# Patient Record
Sex: FEMALE
[De-identification: names, ages, dates, MRNs, and addresses within clinical notes are randomized per-mention and may not be internally consistent; named-entity substitution may affect disease eponyms.]

---

## 2017-03-23 ENCOUNTER — HOSPITAL ENCOUNTER (EMERGENCY)
Dept: HOSPITAL 5 - ED | Age: 52
Discharge: HOME | End: 2017-03-23
Payer: SELF-PAY

## 2017-03-23 VITALS — SYSTOLIC BLOOD PRESSURE: 109 MMHG | DIASTOLIC BLOOD PRESSURE: 73 MMHG

## 2017-03-23 DIAGNOSIS — F12.10: ICD-10-CM

## 2017-03-23 DIAGNOSIS — R07.9: ICD-10-CM

## 2017-03-23 DIAGNOSIS — J40: Primary | ICD-10-CM

## 2017-03-23 DIAGNOSIS — F17.200: ICD-10-CM

## 2017-03-23 DIAGNOSIS — I10: ICD-10-CM

## 2017-03-23 LAB
ANION GAP SERPL CALC-SCNC: 19 MMOL/L
BASOPHILS NFR BLD AUTO: 0.8 % (ref 0–1.8)
BUN SERPL-MCNC: 9 MG/DL (ref 7–17)
BUN/CREAT SERPL: 18 %
CALCIUM SERPL-MCNC: 9.7 MG/DL (ref 8.4–10.2)
CHLORIDE SERPL-SCNC: 95.7 MMOL/L (ref 98–107)
CO2 SERPL-SCNC: 22 MMOL/L (ref 22–30)
EOSINOPHIL NFR BLD AUTO: 0.8 % (ref 0–4.3)
GLUCOSE SERPL-MCNC: 97 MG/DL (ref 65–100)
HCT VFR BLD CALC: 38.5 % (ref 30.3–42.9)
HGB BLD-MCNC: 12.8 GM/DL (ref 10.1–14.3)
MCH RBC QN AUTO: 33 PG (ref 28–32)
MCHC RBC AUTO-ENTMCNC: 33 % (ref 30–34)
MCV RBC AUTO: 99 FL (ref 79–97)
PLATELET # BLD: 148 K/MM3 (ref 140–440)
POTASSIUM SERPL-SCNC: 3.8 MMOL/L (ref 3.6–5)
RBC # BLD AUTO: 3.89 M/MM3 (ref 3.65–5.03)
SODIUM SERPL-SCNC: 133 MMOL/L (ref 137–145)
WBC # BLD AUTO: 7.6 K/MM3 (ref 4.5–11)

## 2017-03-23 PROCEDURE — 84484 ASSAY OF TROPONIN QUANT: CPT

## 2017-03-23 PROCEDURE — 83880 ASSAY OF NATRIURETIC PEPTIDE: CPT

## 2017-03-23 PROCEDURE — 71275 CT ANGIOGRAPHY CHEST: CPT

## 2017-03-23 PROCEDURE — 71010: CPT

## 2017-03-23 PROCEDURE — 93005 ELECTROCARDIOGRAM TRACING: CPT

## 2017-03-23 PROCEDURE — 85025 COMPLETE CBC W/AUTO DIFF WBC: CPT

## 2017-03-23 PROCEDURE — 80048 BASIC METABOLIC PNL TOTAL CA: CPT

## 2017-03-23 PROCEDURE — 36415 COLL VENOUS BLD VENIPUNCTURE: CPT

## 2017-03-23 PROCEDURE — 96374 THER/PROPH/DIAG INJ IV PUSH: CPT

## 2017-03-23 PROCEDURE — 85379 FIBRIN DEGRADATION QUANT: CPT

## 2017-03-23 PROCEDURE — 99285 EMERGENCY DEPT VISIT HI MDM: CPT

## 2017-03-23 NOTE — EMERGENCY DEPARTMENT REPORT
ED Chest Pain HPI





- General


Chief Complaint: Chest Pain


Stated Complaint: CHEST PAIN


Time Seen by Provider: 03/23/17 18:10


Source: patient


Mode of arrival: Ambulatory


Limitations: No Limitations





- History of Present Illness


Initial Comments: 


This is a 51-year-old  female presents to the emergency department by 

EMS from home with complaint of midsternal and left-sided chest discomfort has 

been going on since yesterday.  It is a constant pain but the intensity appears 

to wax and wane.  It does not radiate.  It is associated with a more chronic 

cough that is mixed dry and productive and has been going on for the past 

month.  The patient says that she has a history of this recurrent cough in the 

past and the patient says that she got some "small bottle medications that 

really worked to stop by cough but it was a small amount of medication."  She 

denies any recent travel or sick contacts at home.  She is a tobacco smoker but 

denies any illicit drug use.  She took some aspirin for her symptoms today 

without much relief.  She denies any other significant past medical history.  

She does not have a primary care doctor.  Patient last had a negative stress 

test here at [facility] in May 2016.  The patient also complains of some 

sinus congestion and drainage and says that she has gone through "3 packs of 

toilet paper" in dealing with this.








- Related Data


 Previous Rx's











 Medication  Instructions  Recorded  Last Taken  Type


 


Famotidine [Pepcid] 20 mg PO BID #60 tablet 05/13/16 Unknown Rx


 


Fluticasone [Flonase] 1 spray NS QDAY #1 bottle 03/23/17 Unknown Rx


 


guaiFENesin/CODEINE [Robitussin AC] 5 ml PO Q6H PRN #100 ml 03/23/17 Unknown Rx











 Allergies











Allergy/AdvReac Type Severity Reaction Status Date / Time


 


Penicillins AdvReac  Unknown Verified 04/19/15 14:27














JUDE score





- Jude Score


Age > 65: (0) No


Aspirin use within the Past 7 Days: (0) No


3 or more CAD Risk Factors: (0) No


2 or more Angina events in past 24 hrs: (1) Yes


Known CAD with more than 50% Stenosis: (0) No


Elevated Cardiac Markers: (0) No


ST Deviation Greater than 0.5mm: (0) No


JUDE Score: 1





ED Review of Systems


ROS: 


Stated complaint: CHEST PAIN


Other details as noted in HPI





Comment: All other systems reviewed and negative


Constitutional: denies: chills, fever


Eyes: denies: eye pain, eye discharge, vision change


ENT: congestion.  denies: ear pain, throat pain


Respiratory: cough.  denies: orthopnea


Cardiovascular: chest pain.  denies: edema


Gastrointestinal: denies: abdominal pain, nausea, diarrhea


Genitourinary: denies: urgency, dysuria, discharge


Musculoskeletal: denies: back pain, joint swelling, arthralgia


Skin: denies: rash, lesions


Neurological: denies: headache, weakness, paresthesias





ED Past Medical Hx





- Past Medical History


Hx Hypertension: Yes


Hx Congestive Heart Failure: No


Hx Diabetes: No


Hx Asthma: No


Hx COPD: No


Additional medical history: DIVERCULITITS





- Surgical History


Past Surgical History?: Yes


Additional Surgical History: HERNIA REPAIR X 5, colostomy bag from 

diverticulitis





- Social History


Smoking Status: Current Every Day Smoker


Substance Use Type: Alcohol, Marijuana





- Medications


Home Medications: 


 Home Medications











 Medication  Instructions  Recorded  Confirmed  Last Taken  Type


 


Famotidine [Pepcid] 20 mg PO BID #60 tablet 05/13/16  Unknown Rx


 


Fluticasone [Flonase] 1 spray NS QDAY #1 bottle 03/23/17  Unknown Rx


 


guaiFENesin/CODEINE [Robitussin AC] 5 ml PO Q6H PRN #100 ml 03/23/17  Unknown Rx














ED Physical Exam





- General


Limitations: No Limitations





- Other


Other exam information: 


GENERAL: The patient is well-developed well-nourished.


HEENT: Normocephalic.  Atraumatic.  Extraocular motions are intact.  Patient 

has moist mucous membranes.  Pupils equal reactive to light bilaterally.


NECK: Supple.  Trachea is midline.


CHEST/LUNGS: Clear to auscultation.  There is no respiratory distress noted.  

Chest pain is reproducible to palpation of chest wall.


HEART/CARDIOVASCULAR: Regular.  There is no tachycardia.  There is no gallop 

rub or murmur.


ABDOMEN: Abdomen is soft, nontender.  Patient has normal bowel sounds.  There 

is no abdominal distention.


SKIN: There is no rash.  There is no edema.  There is no diaphoresis.


NEURO: The patient is awake, alert, and oriented.  The patient is cooperative.  

The patient has no focal neurologic deficits.  The patient has normal speech.


MUSCULOSKELETAL: There is no tenderness or deformity.  There is no limitation 

range of motion.  There is no evidence of acute injury.








ED Course


 Vital Signs











  03/23/17 03/23/17 03/23/17





  09:19 09:30 10:00


 


Temperature   


 


Pulse Rate 109 H 125 H 114 H


 


Respiratory 21 32 H 9 L





Rate   


 


Blood Pressure  122/70 119/69


 


O2 Sat by Pulse   





Oximetry   














  03/23/17 03/23/17 03/23/17





  10:30 11:00 11:30


 


Temperature   


 


Pulse Rate 102 H 95 H 121 H


 


Respiratory 14 20 24





Rate   


 


Blood Pressure 110/67 111/68 131/81


 


O2 Sat by Pulse 100 100 100





Oximetry   














  03/23/17 03/23/17 03/23/17





  12:01 12:30 13:00


 


Temperature   


 


Pulse Rate  129 H 114 H


 


Respiratory  13 21





Rate   


 


Blood Pressure 161/112 166/82 149/74


 


O2 Sat by Pulse 73 L 97 99





Oximetry   














  03/23/17 03/23/17 03/23/17





  13:30 14:00 17:45


 


Temperature   98 F


 


Pulse Rate 101 H 104 H 82


 


Respiratory 14 14 16





Rate   


 


Blood Pressure 152/83 145/84 141/98


 


O2 Sat by Pulse 98 98 100





Oximetry   














  03/23/17 03/23/17 03/23/17





  19:13 19:30 20:00


 


Temperature   


 


Pulse Rate  79 80


 


Respiratory  11 L 10 L





Rate   


 


Blood Pressure 121/77 119/71 113/81


 


O2 Sat by Pulse  99 





Oximetry   














  03/23/17 03/23/17 03/23/17





  20:34 21:00 21:30


 


Temperature   


 


Pulse Rate   


 


Respiratory   





Rate   


 


Blood Pressure 119/71 124/82 118/76


 


O2 Sat by Pulse 99 92 97





Oximetry   














  03/23/17





  22:00


 


Temperature 


 


Pulse Rate 


 


Respiratory 





Rate 


 


Blood Pressure 109/73


 


O2 Sat by Pulse 98





Oximetry 














ED Medical Decision Making





- Lab Data


Result diagrams: 


 03/23/17 18:25





 03/23/17 18:18





- EKG Data


-: EKG Interpreted by Me


EKG shows normal: sinus rhythm, axis, intervals, QRS complexes, ST-T waves


Rate: normal





- EKG Data


When compared to previous EKG there are: previous EKG unavailable


Interpretation: normal EKG





- Radiology Data


Radiology results: report reviewed, image reviewed


interpreted by me: 


Chest x-ray did not show any acute process.  Heart is normal shape and size.  

No effusions.  No pneumothorax.  No signs of pneumonia seen.





CT angiography of the chest does not show any pulmonary embolism.  There is 

ectasia of the ascending thoracic aorta.  The thoracic aortic arch and 

descending thoracic aorta well normal in caliber.  No dissection.  Lungs are 

well-expanded.  No infiltrates effusions or pneumothorax.








- Medical Decision Making


51-year-old female presents to the emergency department after complaining of a 

productive cough and some chest discomfort.  Patient was given some Robitussin-

AC and there is been no cough heard during examination or during her ED course.

  The chest pain is reproducible and appears consistent with costochondritis.  

EKG did not show any signs of ST elevation MI.  Patient negative troponins 2 

the emergency department.  Her d-dimer was slightly elevated at equivocal so a 

CT angiography was done that did not show any PE, dissection, pneumonia or any 

acute process.  Patient had a negative stress test less than one year ago.  

With all this, the patient appears safe for discharge home.  She was given a 

referral for cardiology K she wants to pursue an outpatient stress test.  

Otherwise she will return to the ER with any worsening of her symptoms or any 

acute distress.








- Differential Diagnosis


MI, PE, costochondritis, pneumonia, bronchitis


Critical Care Time: No


Critical care attestation.: 


If time is entered above; I have spent that time in minutes in the direct care 

of this critically ill patient, excluding procedure time.








ED Disposition


Clinical Impression: 


 Bronchitis





Chest pain


Qualifiers:


 Chest pain type: unspecified Qualified Code(s): R07.9 - Chest pain, unspecified





Disposition: DISCHARGED TO HOME OR SELFCARE


Is pt being admited?: No


Condition: Stable


Instructions:  Chest Pain (ED), Acute Bronchitis (ED)


Additional Instructions: 


Please follow-up with your primary care doctor in the next few days.  I 

referral for a local cardiologist, Dr. Henriquez, to follow-up regarding your 

chest discomfort for a possible outpatient stress test.  Return to the 

emergency department with any worsening of your symptoms or any acute distress.


Prescriptions: 


Fluticasone [Flonase] 1 spray NS QDAY #1 bottle


guaiFENesin/CODEINE [Robitussin AC] 5 ml PO Q6H PRN #100 ml


 PRN Reason: Cough


Referrals: 


PRIMARY CARE,MD [Primary Care Provider] - 3-5 Days


TERE HENRIQUEZ MD [Staff Physician] - 3-5 Days


Time of Disposition: 22:20

## 2017-03-24 NOTE — XRAY REPORT
PORTABLE CHEST



INDICATION: Chest pain.



COMPARISON: 05/13/2016



FINDINGS: Portable, frontal chest radiograph demonstrates new mild 

horizontal left basilar atelectasis.  Otherwise clear lungs.  Normal 

cardiomediastinal silhouette.  Mild aortic knob calcifications.  Stable 

bones.



CONCLUSION: New slight left basilar atelectasis, as described.



Thank you for the opportunity to participate in this patient's care.

## 2017-05-11 ENCOUNTER — HOSPITAL ENCOUNTER (EMERGENCY)
Dept: HOSPITAL 5 - ED | Age: 52
Discharge: HOME | End: 2017-05-11
Payer: SELF-PAY

## 2017-05-11 VITALS — DIASTOLIC BLOOD PRESSURE: 86 MMHG | SYSTOLIC BLOOD PRESSURE: 130 MMHG
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
## 2017-05-11 DIAGNOSIS — I10: ICD-10-CM

## 2017-05-11 DIAGNOSIS — M54.5: Primary | ICD-10-CM
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
## 2017-05-11 DIAGNOSIS — F17.200: ICD-10-CM
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
## 2017-05-11 PROCEDURE — 99282 EMERGENCY DEPT VISIT SF MDM: CPT

## 2017-05-11 PROCEDURE — 96372 THER/PROPH/DIAG INJ SC/IM: CPT

## 2017-05-11 NOTE — EMERGENCY DEPARTMENT REPORT
ED Fall HPI





- General


Chief Complaint: Fall


Stated Complaint: BACK/BUTTOCK PAIN


Time Seen by Provider: 05/11/17 17:15


Source: patient


Mode of arrival: Ambulatory





- History of Present Illness


Initial Comments: 





51-year-old  female comes and states that she tripped and fell last 

Friday states she hit the back of her head on a dresser.  She complains of 

headache and lower back pain and sacral pain.  Denies vision changes no nausea 

no vomiting no dizziness.  She reports she took Tylenol which helped a little 

bit.  She reports her headache is off and on area and she does report she has a 

goose said in the back of her head.  No past medical history currently takes no 

medication and she is only allergic to penicillin.


MD Complaint: fall


When Fall Occurred: # days PTA (5)


Fall Witnessed: no


Place Fall Occurred: home





- Related Data


 Previous Rx's











 Medication  Instructions  Recorded  Last Taken  Type


 


Naproxen [Naprosyn TAB] 500 mg PO BID #20 tablet 05/11/17 Unknown Rx











 Allergies











Allergy/AdvReac Type Severity Reaction Status Date / Time


 


Penicillins AdvReac  Rash Verified 05/11/17 15:09














ED Review of Systems


ROS: 


Stated complaint: BACK/BUTTOCK PAIN


Other details as noted in HPI








ED Past Medical Hx





- Past Medical History


Hx Hypertension: Yes


Hx Congestive Heart Failure: No


Hx Diabetes: No


Hx Asthma: No


Hx COPD: No


Additional medical history: DIVERCULITITS





- Surgical History


Additional Surgical History: HERNIA REPAIR X 5, colostomy bag from 

diverticulitis / REVERSAL.  C-SECTIONS X 2.  TUBAL LIGATION





- Social History


Smoking Status: Current Every Day Smoker


Substance Use Type: Alcohol





- Medications


Home Medications: 


 Home Medications











 Medication  Instructions  Recorded  Confirmed  Last Taken  Type


 


Naproxen [Naprosyn TAB] 500 mg PO BID #20 tablet 05/11/17  Unknown Rx














ED Physical Exam





- General


Limitations: No Limitations





- Eye


Eye exam: Present: normal appearance, PERRL, EOMI


Pupils: Present: normal accommodation





- ENT


ENT exam: Present: normal exam, mucous membranes moist





- Neck


Neck exam: Present: normal inspection





- Respiratory


Respiratory exam: Present: normal lung sounds bilaterally





- Cardiovascular


Cardiovascular Exam: Present: regular rate, normal rhythm, normal heart sounds





- Back Exam


Back exam: Present: normal inspection, full ROM, paraspinal tenderness (sacral)





- Expanded Neurological Exam


  ** Expanded


Speech: Present: fluid speech


Cranial nerves: EOM's Intact: Normal, Gag Reflex: Normal, Tongue Deviation: 

Normal, Nystagmus: Normal


Cerebellar function: Finger to Nose: Normal, Heel to Shin: Normal, Romberg: 

Normal


Upper motor neuron: Fredo Neglect: Normal, Pronator Drift: Normal


Sensory exam: Upper Extremity Light Touch: Normal, Upper Extremity Pin Prick: 

Normal, Upper Extremity Temperature: Normal, UE 2 Point Discrimination: Normal, 

Lower Extremity Light Touch: Normal, Lower Extremity Pin Prick: Normal


Motor strength exam: RUE: 4, LUE: 4, RLE: 4, LLE: 4


Best Eye Response (Shahid): (4) open spontaneously


Best Motor Response (Bozrah): (6) obeys commands


Best Verbal Response (Shahid): (5) oriented


Bozrah Total: 15





- Psychiatric


Psychiatric exam: Present: normal affect, normal mood





- Skin


Skin exam: Present: dry, intact, normal color





ED Course





 Vital Signs











  05/11/17





  15:12


 


Temperature 97.9 F


 


Pulse Rate 91 H


 


Respiratory 17





Rate 


 


Blood Pressure 129/92


 


O2 Sat by Pulse 100





Oximetry 














- Reevaluation(s)


Reevaluation #1: 





05/11/17 18:37


Patient reports she feels much better after having the Toradol injection.





ED Medical Decision Making





- Medical Decision Making





Patient has been evaluated by this provider fast track.  Discussed patient that 

since her fall was on Friday and she has no neurological deficits that we will 

not do a head CT.  Patient's in a gradients she is more concerned about her 

lower back and that we can get her out of pain.  Discussed the patient and give 

her Toradol injection 30 mg IM.  Discussed patient that I would discharge her 

on her Naproxen today.  Discussed with patient that she needs to follow up with 

the primary care provider if pain persists or gets worse.  Patient verbalized 

understanding.


Critical care attestation.: 


If time is entered above; I have spent that time in minutes in the direct care 

of this critically ill patient, excluding procedure time.








ED Disposition


Clinical Impression: 


Fall


Qualifiers:


 Encounter type: initial encounter Qualified Code(s): W19.XXXA - Unspecified 

fall, initial encounter





Lumbago


Qualifiers:


 Chronicity: acute Back pain laterality: bilateral Sciatica presence: without 

sciatica Qualified Code(s): M54.5 - Low back pain





Disposition: DISCHARGED TO HOME OR SELFCARE


Is pt being admited?: No


Does the pt Need Aspirin: No


Condition: Stable


Instructions:  Low Back Strain (ED), Back Pain (ED)


Additional Instructions: 


Follow-up with your primary care provider or emergency room if pain persist or 

gets worse.


Prescriptions: 


Naproxen [Naprosyn TAB] 500 mg PO BID #20 tablet


Referrals: 


PRIMARY CARE,MD [Primary Care Provider] - 3-5 Days


Forms:  Work/School Release Form(ED)

## 2018-10-09 ENCOUNTER — HOSPITAL ENCOUNTER (EMERGENCY)
Dept: HOSPITAL 5 - ED | Age: 53
Discharge: LEFT BEFORE DISCHARGE | End: 2018-10-09
Payer: SELF-PAY

## 2018-10-09 DIAGNOSIS — R52: Primary | ICD-10-CM

## 2018-10-09 DIAGNOSIS — Z53.21: ICD-10-CM

## 2018-10-10 ENCOUNTER — HOSPITAL ENCOUNTER (EMERGENCY)
Dept: HOSPITAL 5 - ED | Age: 53
LOS: 1 days | Discharge: HOME | End: 2018-10-11
Payer: SELF-PAY

## 2018-10-10 VITALS — SYSTOLIC BLOOD PRESSURE: 116 MMHG | DIASTOLIC BLOOD PRESSURE: 67 MMHG

## 2018-10-10 DIAGNOSIS — F17.200: ICD-10-CM

## 2018-10-10 DIAGNOSIS — Z98.51: ICD-10-CM

## 2018-10-10 DIAGNOSIS — Z88.0: ICD-10-CM

## 2018-10-10 DIAGNOSIS — R07.81: ICD-10-CM

## 2018-10-10 DIAGNOSIS — I10: ICD-10-CM

## 2018-10-10 DIAGNOSIS — R07.89: Primary | ICD-10-CM

## 2018-10-10 PROCEDURE — 99283 EMERGENCY DEPT VISIT LOW MDM: CPT

## 2018-10-10 PROCEDURE — 71046 X-RAY EXAM CHEST 2 VIEWS: CPT

## 2018-10-10 NOTE — EMERGENCY DEPARTMENT REPORT
ED General Adult HPI





- General


Chief complaint: Extremity Injury, Upper


Stated complaint: PAIN


Time Seen by Provider: 10/10/18 23:50


Source: patient


Mode of arrival: Ambulatory


Limitations: No Limitations





- Related Data


 Previous Rx's











 Medication  Instructions  Recorded  Last Taken  Type


 


Naproxen [Naprosyn TAB] 500 mg PO BID #20 tablet 05/11/17 Unknown Rx











 Allergies











Allergy/AdvReac Type Severity Reaction Status Date / Time


 


Penicillins AdvReac  Rash Verified 05/11/17 15:09














ED Review of Systems


ROS: 


Stated complaint: PAIN


Other details as noted in HPI





Constitutional: denies: chills, fever


Eyes: denies: eye pain, eye discharge, vision change


ENT: denies: ear pain, throat pain


Respiratory: denies: cough, shortness of breath, wheezing


Cardiovascular: denies: chest pain, palpitations


Endocrine: no symptoms reported


Gastrointestinal: denies: abdominal pain, nausea, diarrhea


Genitourinary: denies: urgency, dysuria, discharge


Musculoskeletal: denies: back pain, joint swelling, arthralgia


Skin: denies: rash, lesions


Neurological: denies: headache, weakness, paresthesias


Psychiatric: denies: anxiety, depression


Hematological/Lymphatic: denies: easy bleeding, easy bruising





ED Past Medical Hx





- Past Medical History


Hx Hypertension: Yes


Hx Congestive Heart Failure: No


Hx Diabetes: No


Hx Asthma: No


Hx COPD: No


Additional medical history: DIVERCULITITS





- Surgical History


Additional Surgical History: HERNIA REPAIR X 5, colostomy bag from 

diverticulitis / REVERSAL.  C-SECTIONS X 2.  TUBAL LIGATION





- Social History


Smoking Status: Current Every Day Smoker


Substance Use Type: Alcohol





- Medications


Home Medications: 


 Home Medications











 Medication  Instructions  Recorded  Confirmed  Last Taken  Type


 


Naproxen [Naprosyn TAB] 500 mg PO BID #20 tablet 05/11/17  Unknown Rx














ED Physical Exam





- General


Limitations: No Limitations


General appearance: alert, in no apparent distress





- Head


Head exam: Present: atraumatic, normocephalic





- Eye


Eye exam: Present: normal appearance, PERRL


Pupils: Present: normal accommodation





- ENT


ENT exam: Present: normal exam, mucous membranes moist





- Neck


Neck exam: Present: normal inspection





- Respiratory


Respiratory exam: Present: normal lung sounds bilaterally, rhonchi, chest wall 

tenderness (right intercostal region, tender with palpation and chest expansion)

.  Absent: respiratory distress, accessory muscle use, decreased breath sounds





- Cardiovascular


Cardiovascular Exam: Present: regular rate, normal rhythm.  Absent: systolic 

murmur, diastolic murmur, rubs, gallop





- GI/Abdominal


GI/Abdominal exam: Present: soft, normal bowel sounds





- Extremities Exam


Extremities exam: Present: normal inspection





- Back Exam


Back exam: Present: normal inspection





- Neurological Exam


Neurological exam: Present: alert, oriented X3





- Psychiatric


Psychiatric exam: Present: normal affect, normal mood





- Skin


Skin exam: Present: warm, dry, intact, normal color.  Absent: rash





ED Course





 Vital Signs











  10/10/18





  20:44


 


Temperature 97.9 F


 


Pulse Rate 77


 


Respiratory 20





Rate 


 


Blood Pressure 116/67


 


O2 Sat by Pulse 98





Oximetry 











Critical care attestation.: 


If time is entered above; I have spent that time in minutes in the direct care 

of this critically ill patient, excluding procedure time.








ED Disposition


Condition: Stable


Referrals: 


PRIMARY CARE,MD [Primary Care Provider] - 3-5 Days

## 2018-10-11 NOTE — XRAY REPORT
FINAL REPORT



PROCEDURE:  XR CHEST ROUTINE 2V



TECHNIQUE:  Chest radiograph anteroposterior view. CPT 52701







HISTORY:  right chest pain 



COMPARISON:  No prior studies are available for comparison.



FINDINGS:  

Heart: Normal.



Mediastinum/Vessels: Normal.



Lungs/Pleural space: Normal.



Bony thorax: No acute osseous abnormality.



Life support devices: None.



IMPRESSION:  

No acute cardiopulmonary abnormality.

## 2023-07-26 NOTE — CAT SCAN REPORT
FINAL REPORT



PROCEDURE:  CT ANGIO CHEST



TECHNIQUE:  Computerized axial tomographic angiography of the

chest and pulmonary arteries was performed after the IV injection

of iodinated nonionic contrast. The image data was postprocessed

using maximum intensity projection (MIP) and 2-dimensional

multiplanar reformatted (MPR) techniques. The examination is

specifically tailored to the evaluation of the pulmonary arteries

per clinical request. 



HISTORY:  Short of breath 786.09, chest pain 786.50, CP, elevated

dimer 



COMPARISON:  No prior studies are available for comparison.



FINDINGS:  

Heart and pericardium: Normal.



Thoracic aorta: There is ectasia of the ascending thoracic aorta

with maximum dilatation of 5 centimeters. Thoracic aortic arch

and descending thoracic aorta are normal in caliber. There is no

dissection..



Pulmonary vasculature: Normal. No pulmonary emboli.



Lymph nodes: No enlarged thoracic lymph nodes.



Lungs: Lungs are well-expanded. There are no infiltrates.



Pleural space: No effusion, thickening, or pneumothorax.



Musculoskeletal structures: No significant abnormality.



Upper abdominal structures: No significant abnormality.



IMPRESSION:  

There is no pulmonary embolism.



There is ectasia of the ascending thoracic aorta with maximum

dilatation of 5 centimeters. Thoracic aortic arch and descending

thoracic aorta are normal in caliber. There is no dissection..



Lungs are well-expanded. There are no infiltrates. There are no

effusions or pneumothoraces. 



. Nurse Triage SBAR    Is this a 2nd Level Triage? NO    Situation:     Patient calling reporting blistering rash starting in past 3-4 days.    Background:     Unknown cause    Assessment:     Patient reporting rash localized to genital area and both inner thighs, starting in past 3-4 days.  Reporting rash has gradually increased.  Reporting small itchy/painful blisters, 4 -6 inches down on both thighs.  Reporting blisters are very small, non draining.  Redness and itching of genital area.  Afebrile.  Denies signs or symptoms of infection.    Protocol Recommended Disposition:   See in office today.    Recommendation:     Transferred to Central Scheduling.      Reason for Disposition    Painful rash with multiple small blisters grouped together (i.e., dermatomal distribution or 'band' or 'stripe')    Additional Information    Negative: Sounds like a life-threatening emergency to the triager    Negative: Fever and localized purple or blood-colored spots or dots that are not from injury or friction    Negative: Fever and localized rash is very painful    Negative: Patient sounds very sick or weak to the triager    Negative: Looks like a boil, infected sore, deep ulcer, or other infected rash (spreading redness, pus)    Protocols used: RASH OR REDNESS - [id number]